# Patient Record
Sex: MALE
[De-identification: names, ages, dates, MRNs, and addresses within clinical notes are randomized per-mention and may not be internally consistent; named-entity substitution may affect disease eponyms.]

---

## 2018-03-21 ENCOUNTER — IMAGING SERVICES (OUTPATIENT)
Dept: OTHER | Age: 58
End: 2018-03-21

## 2018-03-21 ENCOUNTER — CHARTING TRANS (OUTPATIENT)
Dept: OTHER | Age: 58
End: 2018-03-21

## 2018-03-21 ASSESSMENT — PAIN SCALES - GENERAL: PAINLEVEL_OUTOF10: 4

## 2018-04-02 ENCOUNTER — CHARTING TRANS (OUTPATIENT)
Dept: OTHER | Age: 58
End: 2018-04-02

## 2018-04-17 ENCOUNTER — CHARTING TRANS (OUTPATIENT)
Dept: OTHER | Age: 58
End: 2018-04-17

## 2018-04-17 ENCOUNTER — IMAGING SERVICES (OUTPATIENT)
Dept: OTHER | Age: 58
End: 2018-04-17

## 2018-05-07 ENCOUNTER — ANCILLARY ORDERS (OUTPATIENT)
Dept: OTHER | Age: 58
End: 2018-05-07

## 2018-05-07 DIAGNOSIS — S92.511D: ICD-10-CM

## 2018-10-07 ENCOUNTER — APPOINTMENT (OUTPATIENT)
Dept: CT IMAGING | Age: 58
End: 2018-10-07
Attending: EMERGENCY MEDICINE
Payer: COMMERCIAL

## 2018-10-07 ENCOUNTER — APPOINTMENT (OUTPATIENT)
Dept: GENERAL RADIOLOGY | Age: 58
End: 2018-10-07
Attending: EMERGENCY MEDICINE
Payer: COMMERCIAL

## 2018-10-07 ENCOUNTER — HOSPITAL ENCOUNTER (EMERGENCY)
Age: 58
Discharge: HOME | End: 2018-10-07
Attending: EMERGENCY MEDICINE
Payer: COMMERCIAL

## 2018-10-07 VITALS
SYSTOLIC BLOOD PRESSURE: 144 MMHG | WEIGHT: 210 LBS | HEIGHT: 72 IN | RESPIRATION RATE: 14 BRPM | DIASTOLIC BLOOD PRESSURE: 100 MMHG | HEART RATE: 75 BPM | BODY MASS INDEX: 28.44 KG/M2 | TEMPERATURE: 99 F | OXYGEN SATURATION: 98 %

## 2018-10-07 DIAGNOSIS — I10 HYPERTENSION, UNSPECIFIED TYPE: Primary | ICD-10-CM

## 2018-10-07 PROCEDURE — 85025 COMPLETE CBC W/AUTO DIFF WBC: CPT | Performed by: EMERGENCY MEDICINE

## 2018-10-07 PROCEDURE — 99285 EMERGENCY DEPT VISIT HI MDM: CPT

## 2018-10-07 PROCEDURE — 93010 ELECTROCARDIOGRAM REPORT: CPT

## 2018-10-07 PROCEDURE — 84484 ASSAY OF TROPONIN QUANT: CPT | Performed by: EMERGENCY MEDICINE

## 2018-10-07 PROCEDURE — 70450 CT HEAD/BRAIN W/O DYE: CPT | Performed by: EMERGENCY MEDICINE

## 2018-10-07 PROCEDURE — 80053 COMPREHEN METABOLIC PANEL: CPT | Performed by: EMERGENCY MEDICINE

## 2018-10-07 PROCEDURE — 71045 X-RAY EXAM CHEST 1 VIEW: CPT | Performed by: EMERGENCY MEDICINE

## 2018-10-07 PROCEDURE — 93005 ELECTROCARDIOGRAM TRACING: CPT

## 2018-10-07 PROCEDURE — 81003 URINALYSIS AUTO W/O SCOPE: CPT | Performed by: EMERGENCY MEDICINE

## 2018-10-07 PROCEDURE — 36415 COLL VENOUS BLD VENIPUNCTURE: CPT

## 2018-10-07 RX ORDER — ASPIRIN 81 MG/1
324 TABLET, CHEWABLE ORAL ONCE
Status: COMPLETED | OUTPATIENT
Start: 2018-10-07 | End: 2018-10-07

## 2018-10-07 NOTE — ED PROVIDER NOTES
Patient Seen in: [facility] Emergency Department In [place]    History   Patient presents with:  Blood Pressure  Chest Pain Angina (cardiovascular)    Stated Complaint: chest pain yesterday, high bloodpressure, last few days    HPI    Patient presents wit No      Review of Systems    Positive for stated complaint: chest pain yesterday, high bloodpressure, last few days  Other systems are as noted in HPI. Constitutional and vital signs reviewed.       All other systems reviewed and negative except as noted a individual orders. URINALYSIS WITH CULTURE REFLEX   RAINBOW DRAW BLUE   RAINBOW DRAW LAVENDER   RAINBOW DRAW LIGHT GREEN   RAINBOW DRAW GOLD     EKG    Rate, intervals and axes as noted on EKG Report.   Rate: 72  Rhythm: Sinus Rhythm  Reading: No acute is territorial infarct. 3.  No acute intracranial hemorrhage, mass effect or midline shift. 4.  Mild chronic sinusitis.     Dictated by: Shante Blount MD on 10/07/2018 at 19:32     Approved by: Shante Blount MD            Xr Chest Ap Portable  (cpt=7 will follow-up with his doctor also regarding possible outpatient stress testing.     Disposition and Plan     Clinical Impression:  Hypertension, unspecified type  (primary encounter diagnosis)    Disposition:  Discharge  10/7/2018  7:58 pm    Follow-up:

## 2018-10-10 ENCOUNTER — TELEPHONE (OUTPATIENT)
Dept: NEUROLOGY | Facility: CLINIC | Age: 58
End: 2018-10-10

## 2018-10-10 NOTE — TELEPHONE ENCOUNTER
Office notes received from PCP in anticipation of consult 10/11/18. Endorsed to [address] file for MD review at time of visit.

## 2018-10-11 ENCOUNTER — TELEPHONE (OUTPATIENT)
Dept: NEUROLOGY | Facility: CLINIC | Age: 58
End: 2018-10-11

## 2018-10-11 ENCOUNTER — OFFICE VISIT (OUTPATIENT)
Dept: NEUROLOGY | Facility: CLINIC | Age: 58
End: 2018-10-11
Payer: COMMERCIAL

## 2018-10-11 VITALS
WEIGHT: 228 LBS | SYSTOLIC BLOOD PRESSURE: 132 MMHG | DIASTOLIC BLOOD PRESSURE: 84 MMHG | BODY MASS INDEX: 30.88 KG/M2 | HEART RATE: 68 BPM | HEIGHT: 72 IN | RESPIRATION RATE: 16 BRPM

## 2018-10-11 DIAGNOSIS — R90.89 ABNORMAL CT OF BRAIN: ICD-10-CM

## 2018-10-11 DIAGNOSIS — R68.89 SPELLS OF DECREASED ATTENTIVENESS: Primary | ICD-10-CM

## 2018-10-11 DIAGNOSIS — R41.3 MEMORY LOSS: ICD-10-CM

## 2018-10-11 DIAGNOSIS — R51.9 HEADACHE DISORDER: ICD-10-CM

## 2018-10-11 PROCEDURE — 99244 OFF/OP CNSLTJ NEW/EST MOD 40: CPT | Performed by: OTHER

## 2018-10-11 RX ORDER — ATENOLOL AND CHLORTHALIDONE 50; 25 MG/1; MG/1
1 TABLET ORAL DAILY
COMMUNITY

## 2018-10-11 RX ORDER — ATENOLOL 100 MG/1
100 TABLET ORAL DAILY
COMMUNITY
End: 2018-10-11

## 2018-10-11 NOTE — H&P
[facility] Patient / Consult Visit    Bernardo Wu is a 59year old male.                          Referring MD: Divya Baker    Patient presents with:  Neurologic Problem: C/O of daily headaches,short term memory loss, He denies history of stroke, concussion, tobacco use, or EtOH abuse. No family history of dementia.            Otherwise, patient denies any recent weight change, fevers, chills, nausea, double vision/ blurry vision / loss of vision, chest pain full range of motion; no carotid bruits    Mental status:  Alert and oriented to time, place, person, and situation  Speech: fluent  Language: normal naming, repetition, and comprehension  Memory: impaired   Attention/concentration: normal    Detailed test territorial infarct. 3.  No acute intracranial hemorrhage, mass effect or midline shift. 4.  Mild chronic sinusitis.       Labs reviewed; to be scanned     IMPRESSION AND PLAN:   Stew Chandler is a 59year old male with PMHx significant for ADELITA, of sleep, and he endorses symptoms of restless legs syndrome.   I did advise that we could start him on a low-dose of gabapentin for treatment, or alternatively await improvement with improvement of his sleep apnea and monitoring for new or worsening sympto

## 2018-10-11 NOTE — PATIENT INSTRUCTIONS
Have MRI brain and EEG done  Follow up in ~ 2 months, after testing done     Refill policies:    • Allow 2-3 business days for refills; controlled substances may take longer.   • Contact your pharmacy at least 5 days prior to running out of medication and h done without insurance authorization, patient may be responsible for the entire amount billed. Precertification and Prior Authorizations: If your physician has recommended that you have a procedure or additional testing performed.   Primo Raza

## 2018-10-15 ENCOUNTER — TELEPHONE (OUTPATIENT)
Dept: NEUROLOGY | Facility: CLINIC | Age: 58
End: 2018-10-15

## 2018-10-16 ENCOUNTER — TELEPHONE (OUTPATIENT)
Dept: NEUROLOGY | Facility: CLINIC | Age: 58
End: 2018-10-16

## 2018-11-13 ENCOUNTER — TELEPHONE (OUTPATIENT)
Dept: NEUROLOGY | Facility: CLINIC | Age: 58
End: 2018-11-13

## 2018-11-13 NOTE — TELEPHONE ENCOUNTER
Results of patient's recent lab testing received for physician review. Placed on provider desk for review.

## 2018-12-14 ENCOUNTER — NURSE ONLY (OUTPATIENT)
Dept: ELECTROPHYSIOLOGY | Facility: HOSPITAL | Age: 58
End: 2018-12-14
Attending: Other
Payer: COMMERCIAL

## 2018-12-14 ENCOUNTER — HOSPITAL ENCOUNTER (OUTPATIENT)
Dept: MRI IMAGING | Facility: HOSPITAL | Age: 58
Discharge: HOME | End: 2018-12-14
Attending: Other
Payer: COMMERCIAL

## 2018-12-14 DIAGNOSIS — R41.3 MEMORY LOSS: ICD-10-CM

## 2018-12-14 DIAGNOSIS — R68.89 SPELLS OF DECREASED ATTENTIVENESS: ICD-10-CM

## 2018-12-14 DIAGNOSIS — R51.9 HEADACHE DISORDER: ICD-10-CM

## 2018-12-14 DIAGNOSIS — R90.89 ABNORMAL CT OF BRAIN: ICD-10-CM

## 2018-12-14 PROCEDURE — A9575 INJ GADOTERATE MEGLUMI 0.1ML: HCPCS | Performed by: OTHER

## 2018-12-14 PROCEDURE — 70553 MRI BRAIN STEM W/O & W/DYE: CPT | Performed by: OTHER

## 2018-12-14 PROCEDURE — 95819 EEG AWAKE AND ASLEEP: CPT | Performed by: OTHER

## 2018-12-14 PROCEDURE — 82565 ASSAY OF CREATININE: CPT

## 2018-12-21 ENCOUNTER — OFFICE VISIT (OUTPATIENT)
Dept: NEUROLOGY | Facility: CLINIC | Age: 58
End: 2018-12-21
Payer: COMMERCIAL

## 2018-12-21 VITALS
HEART RATE: 53 BPM | RESPIRATION RATE: 16 BRPM | SYSTOLIC BLOOD PRESSURE: 118 MMHG | HEIGHT: 72 IN | DIASTOLIC BLOOD PRESSURE: 74 MMHG | BODY MASS INDEX: 30.88 KG/M2 | WEIGHT: 228 LBS

## 2018-12-21 DIAGNOSIS — R90.89 ABNORMAL CT OF BRAIN: ICD-10-CM

## 2018-12-21 DIAGNOSIS — R41.3 MEMORY LOSS: ICD-10-CM

## 2018-12-21 DIAGNOSIS — R68.89 SPELLS OF DECREASED ATTENTIVENESS: Primary | ICD-10-CM

## 2018-12-21 PROCEDURE — 99213 OFFICE O/P EST LOW 20 MIN: CPT | Performed by: OTHER

## 2018-12-21 NOTE — PROCEDURES
[address] [facility] in [place]  in affiliation with [facility]  [address]  [address]  [phone]  Fax [phone]      Name: Jean-Paul Pelaez  12/29/1960  [place]

## 2018-12-21 NOTE — PATIENT INSTRUCTIONS
Refill policies:    • Allow 2-3 business days for refills; controlled substances may take longer.   • Contact your pharmacy at least 5 days prior to running out of medication and have them send an electronic request or submit request through the [facility] entire amount billed. Precertification and Prior Authorizations: If your physician has recommended that you have a procedure or additional testing performed.   [facility]) will contact your insurance carrier to obtain pre-certi

## 2018-12-21 NOTE — PROGRESS NOTES
Jina Progress Note    HPI  Patient presents with:  Neurologic Problem:  Follow up and discuss MRI Brain and EEG  results      As per my initial H&P from 10/11/18:    \" Bonnee Bamberger is a 59year old, who presents for migel dementia. Otherwise, patient denies any recent weight change, fevers, chills, nausea, double vision/ blurry vision / loss of vision, chest pain, palpitations, shortness of breath, rashes, joint pains, bowel / bladder incontinence or mood issues. HPI.    Exam:  /74 (BP Location: Left arm, Patient Position: Sitting, Cuff Size: large)   Pulse 53   Resp 16   Ht 72\"   Wt 228 lb   BMI 30.92 kg/m²   Estimated body mass index is 30.92 kg/m² as calculated from the following:    Height as of this enc deposition involving the anterior falx consistent with the   CT findings. Post infusion imaging otherwise demonstrates no abnormal enhancement. Basal cisterns are normal.  No extra cerebral collection. Large vessel flow voids are present.   There is mild results of these investigations.    (R68.89) Spells of decreased attentiveness  (primary encounter diagnosis)  Plan: as noted above     (R41.3) Memory loss  Plan: as noted above     (R90.89) Abnormal CT of brain  Plan: as noted above     Return in about 6

## 2019-01-08 LAB — CREAT SERPL-MCNC: 0.8 MG/DL (ref 0.7–1.3)

## 2019-03-06 VITALS
OXYGEN SATURATION: 97 % | RESPIRATION RATE: 16 BRPM | SYSTOLIC BLOOD PRESSURE: 131 MMHG | HEART RATE: 61 BPM | DIASTOLIC BLOOD PRESSURE: 87 MMHG | TEMPERATURE: 97 F

## 2019-03-06 VITALS
HEART RATE: 80 BPM | HEIGHT: 72 IN | DIASTOLIC BLOOD PRESSURE: 81 MMHG | BODY MASS INDEX: 30.48 KG/M2 | RESPIRATION RATE: 16 BRPM | WEIGHT: 225 LBS | SYSTOLIC BLOOD PRESSURE: 124 MMHG

## 2020-03-06 ENCOUNTER — OFFICE VISIT (OUTPATIENT)
Dept: FAMILY MEDICINE CLINIC | Facility: CLINIC | Age: 60
End: 2020-03-06
Payer: COMMERCIAL

## 2020-03-06 VITALS
HEART RATE: 62 BPM | BODY MASS INDEX: 32.51 KG/M2 | DIASTOLIC BLOOD PRESSURE: 82 MMHG | SYSTOLIC BLOOD PRESSURE: 114 MMHG | HEIGHT: 72 IN | RESPIRATION RATE: 14 BRPM | WEIGHT: 240 LBS | TEMPERATURE: 99 F | OXYGEN SATURATION: 99 %

## 2020-03-06 DIAGNOSIS — R68.89 FLU-LIKE SYMPTOMS: ICD-10-CM

## 2020-03-06 DIAGNOSIS — J02.9 SORE THROAT: Primary | ICD-10-CM

## 2020-03-06 DIAGNOSIS — J02.0 STREP THROAT: ICD-10-CM

## 2020-03-06 LAB
CONTROL LINE PRESENT WITH A CLEAR BACKGROUND (YES/NO): YES YES/NO
KIT LOT #: ABNORMAL NUMERIC
POCT INFLUENZA A: NEGATIVE
POCT INFLUENZA B: NEGATIVE
STREP GRP A CUL-SCR: POSITIVE

## 2020-03-06 PROCEDURE — 87502 INFLUENZA DNA AMP PROBE: CPT | Performed by: NURSE PRACTITIONER

## 2020-03-06 PROCEDURE — 87880 STREP A ASSAY W/OPTIC: CPT | Performed by: NURSE PRACTITIONER

## 2020-03-06 PROCEDURE — 99202 OFFICE O/P NEW SF 15 MIN: CPT | Performed by: NURSE PRACTITIONER

## 2020-03-06 RX ORDER — AMOXICILLIN 500 MG/1
500 CAPSULE ORAL 2 TIMES DAILY
Qty: 20 CAPSULE | Refills: 0 | Status: SHIPPED | OUTPATIENT
Start: 2020-03-06 | End: 2020-03-16

## 2020-03-06 RX ORDER — MELOXICAM 7.5 MG/1
TABLET ORAL
COMMUNITY
Start: 2019-12-30 | End: 2021-07-14

## 2020-03-06 NOTE — PROGRESS NOTES
Patient presents with:  Flu: body aches, cough, sore throat, x3 days  :    HPI:   Stew Chandler is a 62year old male who presents for upper respiratory symptoms for  3  days. Started suddenly. Symptoms have been better since onset.   Feeling fev GENERAL: well developed, well nourished, in no apparent distress, acutely sick. SKIN: no rashes,no suspicious lesions, flushed. not Warm to touch.   HEAD: atraumatic, normocephalic,  no tenderness on palpation of sinuses  EYES: conjunctiva clear, EOM intac Patient Instructions       Pharyngitis: Strep (Confirmed)    You have had a positive test for strep throat. Strep throat is a contagious illness. It is spread by coughing, kissing or by touching others after touching your mouth or nose.  Symptoms include · Lymph nodes getting larger or becoming soft in the middle  · You can't swallow liquids or you can't open your mouth wide because of throat pain  · Signs of dehydration. These include very dark urine or no urine, sunken eyes, and dizziness.   · Trouble susana The flu is caused by a virus. The virus spreads through the air in droplets when someone who has the flu coughs, sneezes, laughs, or talks. You can become infected when you inhale these viruses directly.  You can also become infected when you touch a surfac · Wash your hands often, especially after coughing or sneezing. Or clean your hands with an alcohol-based hand  containing at least 60% alcohol. · Cough or sneeze into a tissue. Then throw the tissue away and wash your hands.  If you don’t have a ti Handwashing is one of the best ways to prevent many common infections. If you are caring for or visiting someone with the flu, wash your hands each time you enter and leave the room. Follow these steps:  · Use warm water and plenty of soap.  Rub your hands

## 2020-03-06 NOTE — PATIENT INSTRUCTIONS
Pharyngitis: Strep (Confirmed)    You have had a positive test for strep throat. Strep throat is a contagious illness. It is spread by coughing, kissing or by touching others after touching your mouth or nose.  Symptoms include throat pain that is worse w · Lymph nodes getting larger or becoming soft in the middle  · You can't swallow liquids or you can't open your mouth wide because of throat pain  · Signs of dehydration. These include very dark urine or no urine, sunken eyes, and dizziness.   · Trouble susana The flu is caused by a virus. The virus spreads through the air in droplets when someone who has the flu coughs, sneezes, laughs, or talks. You can become infected when you inhale these viruses directly.  You can also become infected when you touch a surfac · Wash your hands often, especially after coughing or sneezing. Or clean your hands with an alcohol-based hand  containing at least 60% alcohol. · Cough or sneeze into a tissue. Then throw the tissue away and wash your hands.  If you don’t have a ti Handwashing is one of the best ways to prevent many common infections. If you are caring for or visiting someone with the flu, wash your hands each time you enter and leave the room. Follow these steps:  · Use warm water and plenty of soap.  Rub your hands

## 2020-10-07 PROBLEM — M17.4 OTHER SECONDARY OSTEOARTHRITIS OF BOTH KNEES: Status: ACTIVE | Noted: 2020-10-07

## 2020-10-07 PROBLEM — M76.52 PATELLAR TENDINITIS OF BOTH KNEES: Status: ACTIVE | Noted: 2020-10-07

## 2020-10-07 PROBLEM — M76.51 PATELLAR TENDINITIS OF BOTH KNEES: Status: ACTIVE | Noted: 2020-10-07

## 2021-07-06 ENCOUNTER — TELEPHONE (OUTPATIENT)
Dept: NEUROLOGY | Facility: CLINIC | Age: 61
End: 2021-07-06

## 2021-07-14 ENCOUNTER — OFFICE VISIT (OUTPATIENT)
Dept: NEUROLOGY | Facility: CLINIC | Age: 61
End: 2021-07-14
Payer: COMMERCIAL

## 2021-07-14 VITALS
SYSTOLIC BLOOD PRESSURE: 126 MMHG | HEART RATE: 80 BPM | DIASTOLIC BLOOD PRESSURE: 65 MMHG | BODY MASS INDEX: 32.51 KG/M2 | RESPIRATION RATE: 16 BRPM | WEIGHT: 240 LBS | HEIGHT: 72 IN

## 2021-07-14 DIAGNOSIS — R41.89 COGNITIVE CHANGES: ICD-10-CM

## 2021-07-14 DIAGNOSIS — G31.9 CEREBRAL ATROPHY (HCC): ICD-10-CM

## 2021-07-14 DIAGNOSIS — R68.89 SPELLS OF DECREASED ATTENTIVENESS: Primary | ICD-10-CM

## 2021-07-14 DIAGNOSIS — R41.3 MEMORY LOSS: ICD-10-CM

## 2021-07-14 PROCEDURE — 99213 OFFICE O/P EST LOW 20 MIN: CPT | Performed by: OTHER

## 2021-07-14 PROCEDURE — 3008F BODY MASS INDEX DOCD: CPT | Performed by: OTHER

## 2021-07-14 PROCEDURE — 3074F SYST BP LT 130 MM HG: CPT | Performed by: OTHER

## 2021-07-14 PROCEDURE — 3078F DIAST BP <80 MM HG: CPT | Performed by: OTHER

## 2021-07-14 RX ORDER — CARISOPRODOL 250 MG/1
TABLET ORAL
COMMUNITY

## 2021-07-14 NOTE — PROGRESS NOTES
Jina Progress Note    HPI  Patient presents with:  Neurologic Problem: Neuralgia      As per my initial H&P from 10/11/18:    \" Bob Canseco is a 59year old, who presents for evaluation of headaches and confusion. denies any recent weight change, fevers, chills, nausea, double vision/ blurry vision / loss of vision, chest pain, palpitations, shortness of breath, rashes, joint pains, bowel / bladder incontinence or mood issues.  \"         Patient has not been seen si •  Carisoprodol (SOMA) 250 MG Oral Tab, Take by mouth., Disp: , Rfl:   •  Meloxicam 15 MG Oral Tab, Take 1 tablet (15 mg total) by mouth daily. , Disp: 30 tablet, Rfl: 1  •  Atenolol-Chlorthalidone 50-25 MG Oral Tab, Take 1 tablet by mouth daily. , Disp: , report available but on independent reviewed, generalized atrophy with some frontal predominance as previously noted. Prior as noted below    MRI brain with and without contrast (12/14/18): FINDINGS:   Mild cortical atrophy.   There is no evidence of secondary structural pathology is noted to account for headaches.     However MOCA now 28/30 and he appears to have some difficulty specifically with shifting attention on trails test - given continued atrophy, will check reversible causes of memory loss wi

## 2022-04-15 ENCOUNTER — HOSPITAL ENCOUNTER (EMERGENCY)
Age: 62
Discharge: HOME | End: 2022-04-15
Attending: EMERGENCY MEDICINE
Payer: COMMERCIAL

## 2022-04-15 ENCOUNTER — APPOINTMENT (OUTPATIENT)
Dept: CT IMAGING | Age: 62
End: 2022-04-15
Attending: EMERGENCY MEDICINE
Payer: COMMERCIAL

## 2022-04-15 VITALS
RESPIRATION RATE: 18 BRPM | WEIGHT: 220.44 LBS | BODY MASS INDEX: 29.86 KG/M2 | OXYGEN SATURATION: 98 % | DIASTOLIC BLOOD PRESSURE: 84 MMHG | SYSTOLIC BLOOD PRESSURE: 132 MMHG | HEIGHT: 72 IN | TEMPERATURE: 98 F | HEART RATE: 79 BPM

## 2022-04-15 DIAGNOSIS — G44.209 TENSION HEADACHE: Primary | ICD-10-CM

## 2022-04-15 PROCEDURE — 70450 CT HEAD/BRAIN W/O DYE: CPT | Performed by: EMERGENCY MEDICINE

## 2022-04-15 PROCEDURE — 99284 EMERGENCY DEPT VISIT MOD MDM: CPT

## 2022-04-15 RX ORDER — IBUPROFEN 600 MG/1
600 TABLET ORAL ONCE
Status: COMPLETED | OUTPATIENT
Start: 2022-04-15 | End: 2022-04-15

## 2022-04-15 RX ORDER — CYCLOBENZAPRINE HCL 10 MG
10 TABLET ORAL 3 TIMES DAILY PRN
Qty: 20 TABLET | Refills: 0 | Status: SHIPPED | OUTPATIENT
Start: 2022-04-15 | End: 2022-04-22

## 2022-04-16 NOTE — ED INITIAL ASSESSMENT (HPI)
Pt states pta had an argument with his wife and now has frontal headache 5/10. Denies visual/audiotory impairment. Recently dx depression.  Denies suicidal ideation

## (undated) NOTE — ED AVS SNAPSHOT
Ayaz Alarcon   MRN: [id number]    Department:  [facility] in [place]   Date of Visit:  10/7/2018           Disclosure     Insurance plans vary and the physician(s) referred by the ER may not be covered by your plan.  Yossi tell this physician (or your personal doctor if your instructions are to return to your personal doctor) about any new or lasting problems. The primary care or specialist physician will see patients referred from the [facility] Emergency Department.  Mikal Mccullough

## (undated) NOTE — LETTER
10/11/18    Dear Dr. Wyatt Rodriguez      Thank you for referring your patient, Shadi Mitchell to me for an evaluation. Please see my initial consult note enclosed below. Let me know if you have any questions.     Thank you  Trent Pollard MD, Jeff Oneill home or when driving; feels he is having issues in the past 3 months where he is not able to express what he is thinking properly and also has been having brief periods of time where he does not remember what he is supposed to be doing.      He also notes t /84 (BP Location: Left arm, Patient Position: Sitting, Cuff Size: large)   Pulse 68   Resp 16   Ht 72\"   Wt 228 lb   BMI 30.92 kg/m²   Estimated body mass index is 30.92 kg/m² as calculated from the following:    Height as of this encounter: 72\". Vibration is normal  Proprioception is normal  Romberg is absent  Coordination:  Finger to nose normal bilaterally  Rapid alternating movements normal bilaterally  Heel to shin is normal bilaterally  DTRs:   2+, symmetric, throughout, toes downgoing bilate has not been cleaning his machine properly, and states that he has not been getting restful sleep for many months. He states after he cleaned his machine then he has been having better sleep, and feels better with no headaches.   It is possible that his he

## (undated) NOTE — LETTER
Date: 3/6/2020    Patient Name: Marcelo Mooney          To Whom it may concern:     This letter has been written at the patient's request. The above patient was seen at the [facility] for treatment of a medical condition, strep throat